# Patient Record
Sex: MALE | Race: WHITE | Employment: FULL TIME | URBAN - METROPOLITAN AREA
[De-identification: names, ages, dates, MRNs, and addresses within clinical notes are randomized per-mention and may not be internally consistent; named-entity substitution may affect disease eponyms.]

---

## 2019-01-09 ENCOUNTER — HOSPITAL ENCOUNTER (EMERGENCY)
Age: 24
Discharge: HOME | End: 2019-01-09
Payer: COMMERCIAL

## 2019-01-09 VITALS
WEIGHT: 145 LBS | TEMPERATURE: 97.9 F | SYSTOLIC BLOOD PRESSURE: 139 MMHG | DIASTOLIC BLOOD PRESSURE: 72 MMHG | RESPIRATION RATE: 16 BRPM | HEIGHT: 71 IN | HEART RATE: 97 BPM | OXYGEN SATURATION: 100 % | BODY MASS INDEX: 20.3 KG/M2

## 2019-01-09 DIAGNOSIS — M54.50 ACUTE LOW BACK PAIN WITHOUT SCIATICA, UNSPECIFIED BACK PAIN LATERALITY: ICD-10-CM

## 2019-01-09 DIAGNOSIS — R10.9 ABDOMINAL PAIN, UNSPECIFIED ABDOMINAL LOCATION: Primary | ICD-10-CM

## 2019-01-09 LAB
ALBUMIN SERPL-MCNC: 4.6 GM/DL (ref 3.4–5)
ALP BLD-CCNC: 58 IU/L (ref 40–129)
ALT SERPL-CCNC: 12 U/L (ref 10–40)
ANION GAP SERPL CALCULATED.3IONS-SCNC: 10 MMOL/L (ref 4–16)
AST SERPL-CCNC: 16 IU/L (ref 15–37)
BASOPHILS ABSOLUTE: 0 K/CU MM
BASOPHILS RELATIVE PERCENT: 0.5 % (ref 0–1)
BILIRUB SERPL-MCNC: 0.3 MG/DL (ref 0–1)
BUN BLDV-MCNC: 17 MG/DL (ref 6–23)
CALCIUM SERPL-MCNC: 9.3 MG/DL (ref 8.3–10.6)
CHLORIDE BLD-SCNC: 105 MMOL/L (ref 99–110)
CO2: 25 MMOL/L (ref 21–32)
CREAT SERPL-MCNC: 0.9 MG/DL (ref 0.9–1.3)
DIFFERENTIAL TYPE: ABNORMAL
EOSINOPHILS ABSOLUTE: 0.1 K/CU MM
EOSINOPHILS RELATIVE PERCENT: 1 % (ref 0–3)
GFR AFRICAN AMERICAN: >60 ML/MIN/1.73M2
GFR NON-AFRICAN AMERICAN: >60 ML/MIN/1.73M2
GLUCOSE BLD-MCNC: 96 MG/DL (ref 70–99)
HCT VFR BLD CALC: 47 % (ref 42–52)
HEMOGLOBIN: 14.3 GM/DL (ref 13.5–18)
IMMATURE NEUTROPHIL %: 0.3 % (ref 0–0.43)
LIPASE: 22 IU/L (ref 13–60)
LYMPHOCYTES ABSOLUTE: 2.7 K/CU MM
LYMPHOCYTES RELATIVE PERCENT: 44.3 % (ref 24–44)
MCH RBC QN AUTO: 25.4 PG (ref 27–31)
MCHC RBC AUTO-ENTMCNC: 30.4 % (ref 32–36)
MCV RBC AUTO: 83.6 FL (ref 78–100)
MONOCYTES ABSOLUTE: 0.3 K/CU MM
MONOCYTES RELATIVE PERCENT: 5.6 % (ref 0–4)
NUCLEATED RBC %: 0 %
PDW BLD-RTO: 13.4 % (ref 11.7–14.9)
PLATELET # BLD: 263 K/CU MM (ref 140–440)
PMV BLD AUTO: 9.2 FL (ref 7.5–11.1)
POTASSIUM SERPL-SCNC: 4.2 MMOL/L (ref 3.5–5.1)
RBC # BLD: 5.62 M/CU MM (ref 4.6–6.2)
SEGMENTED NEUTROPHILS ABSOLUTE COUNT: 2.9 K/CU MM
SEGMENTED NEUTROPHILS RELATIVE PERCENT: 48.3 % (ref 36–66)
SODIUM BLD-SCNC: 140 MMOL/L (ref 135–145)
TOTAL IMMATURE NEUTOROPHIL: 0.02 K/CU MM
TOTAL NUCLEATED RBC: 0 K/CU MM
TOTAL PROTEIN: 8.1 GM/DL (ref 6.4–8.2)
WBC # BLD: 6.1 K/CU MM (ref 4–10.5)

## 2019-01-09 PROCEDURE — 80053 COMPREHEN METABOLIC PANEL: CPT

## 2019-01-09 PROCEDURE — 85025 COMPLETE CBC W/AUTO DIFF WBC: CPT

## 2019-01-09 PROCEDURE — 99284 EMERGENCY DEPT VISIT MOD MDM: CPT

## 2019-01-09 PROCEDURE — 83690 ASSAY OF LIPASE: CPT

## 2019-01-09 PROCEDURE — 36415 COLL VENOUS BLD VENIPUNCTURE: CPT

## 2019-01-09 RX ORDER — NAPROXEN 500 MG/1
500 TABLET ORAL 2 TIMES DAILY PRN
Qty: 30 TABLET | Refills: 0 | Status: SHIPPED | OUTPATIENT
Start: 2019-01-09

## 2019-01-09 RX ORDER — CYCLOBENZAPRINE HCL 10 MG
10 TABLET ORAL 3 TIMES DAILY PRN
Qty: 15 TABLET | Refills: 0 | Status: SHIPPED | OUTPATIENT
Start: 2019-01-09

## 2019-01-09 ASSESSMENT — PAIN DESCRIPTION - LOCATION: LOCATION: ABDOMEN

## 2019-01-09 ASSESSMENT — PAIN SCALES - GENERAL: PAINLEVEL_OUTOF10: 6

## 2019-01-09 ASSESSMENT — PAIN DESCRIPTION - DESCRIPTORS: DESCRIPTORS: ACHING

## 2019-01-10 ENCOUNTER — APPOINTMENT (OUTPATIENT)
Dept: CT IMAGING | Age: 24
End: 2019-01-10
Payer: COMMERCIAL

## 2019-01-10 ENCOUNTER — HOSPITAL ENCOUNTER (EMERGENCY)
Age: 24
Discharge: HOME | End: 2019-01-10
Attending: EMERGENCY MEDICINE
Payer: COMMERCIAL

## 2019-01-10 ENCOUNTER — APPOINTMENT (OUTPATIENT)
Dept: ULTRASOUND IMAGING | Age: 24
End: 2019-01-10
Payer: COMMERCIAL

## 2019-01-10 VITALS
BODY MASS INDEX: 20.72 KG/M2 | RESPIRATION RATE: 16 BRPM | WEIGHT: 148 LBS | HEIGHT: 71 IN | DIASTOLIC BLOOD PRESSURE: 79 MMHG | TEMPERATURE: 97.4 F | SYSTOLIC BLOOD PRESSURE: 120 MMHG | HEART RATE: 110 BPM | OXYGEN SATURATION: 99 %

## 2019-01-10 DIAGNOSIS — R10.9 ABDOMINAL PAIN, UNSPECIFIED ABDOMINAL LOCATION: Primary | ICD-10-CM

## 2019-01-10 DIAGNOSIS — N50.819 TESTICULAR PAIN: ICD-10-CM

## 2019-01-10 LAB
BACTERIA: ABNORMAL /HPF
BILIRUBIN URINE: NEGATIVE MG/DL
BLOOD, URINE: NEGATIVE
CAST TYPE: ABNORMAL /HPF
CLARITY: CLEAR
COLOR: YELLOW
CRYSTAL TYPE: ABNORMAL /HPF
EPITHELIAL CELLS, UA: ABNORMAL /HPF
GLUCOSE, URINE: NEGATIVE MG/DL
KETONES, URINE: NEGATIVE MG/DL
LEUKOCYTE ESTERASE, URINE: NEGATIVE
MUCUS: ABNORMAL HPF
NITRITE URINE, QUANTITATIVE: NEGATIVE
PH, URINE: 6 (ref 5–8)
PROTEIN UA: NEGATIVE MG/DL
RBC URINE: ABNORMAL /HPF (ref 0–3)
SPECIFIC GRAVITY UA: 1.02 (ref 1–1.03)
UROBILINOGEN, URINE: 0.2 MG/DL (ref 0.2–1)
VOLUME, (UVOL): 12 ML (ref 10–12)
WBC UA: ABNORMAL /HPF (ref 0–2)

## 2019-01-10 PROCEDURE — 99284 EMERGENCY DEPT VISIT MOD MDM: CPT

## 2019-01-10 PROCEDURE — 81001 URINALYSIS AUTO W/SCOPE: CPT

## 2019-01-10 PROCEDURE — 76870 US EXAM SCROTUM: CPT

## 2019-01-10 PROCEDURE — 74176 CT ABD & PELVIS W/O CONTRAST: CPT

## 2019-01-10 PROCEDURE — 93975 VASCULAR STUDY: CPT

## 2019-01-10 ASSESSMENT — PAIN SCALES - GENERAL: PAINLEVEL_OUTOF10: 6

## 2019-01-10 ASSESSMENT — PAIN DESCRIPTION - LOCATION: LOCATION: ABDOMEN;GROIN

## 2019-01-10 ASSESSMENT — PAIN DESCRIPTION - ORIENTATION: ORIENTATION: RIGHT

## 2019-01-10 ASSESSMENT — PAIN DESCRIPTION - DESCRIPTORS: DESCRIPTORS: BURNING;SHARP;STABBING

## 2019-01-10 ASSESSMENT — PAIN DESCRIPTION - PAIN TYPE: TYPE: ACUTE PAIN

## 2022-10-13 ENCOUNTER — HOSPITAL ENCOUNTER (EMERGENCY)
Age: 27
Discharge: HOME | End: 2022-10-13
Attending: EMERGENCY MEDICINE

## 2022-10-13 VITALS
BODY MASS INDEX: 22.35 KG/M2 | DIASTOLIC BLOOD PRESSURE: 92 MMHG | SYSTOLIC BLOOD PRESSURE: 131 MMHG | RESPIRATION RATE: 16 BRPM | TEMPERATURE: 100.5 F | HEART RATE: 96 BPM | OXYGEN SATURATION: 98 % | HEIGHT: 72 IN | WEIGHT: 165 LBS

## 2022-10-13 DIAGNOSIS — J01.90 ACUTE SINUSITIS, RECURRENCE NOT SPECIFIED, UNSPECIFIED LOCATION: Primary | ICD-10-CM

## 2022-10-13 LAB
SARS-COV-2, NAAT: NOT DETECTED
SOURCE: NORMAL

## 2022-10-13 PROCEDURE — 96374 THER/PROPH/DIAG INJ IV PUSH: CPT

## 2022-10-13 PROCEDURE — 87081 CULTURE SCREEN ONLY: CPT

## 2022-10-13 PROCEDURE — 87635 SARS-COV-2 COVID-19 AMP PRB: CPT

## 2022-10-13 PROCEDURE — 6370000000 HC RX 637 (ALT 250 FOR IP): Performed by: EMERGENCY MEDICINE

## 2022-10-13 PROCEDURE — 87430 STREP A AG IA: CPT

## 2022-10-13 PROCEDURE — 96361 HYDRATE IV INFUSION ADD-ON: CPT

## 2022-10-13 PROCEDURE — 6360000002 HC RX W HCPCS: Performed by: EMERGENCY MEDICINE

## 2022-10-13 PROCEDURE — 2580000003 HC RX 258: Performed by: EMERGENCY MEDICINE

## 2022-10-13 PROCEDURE — 99284 EMERGENCY DEPT VISIT MOD MDM: CPT

## 2022-10-13 RX ORDER — ACETAMINOPHEN 325 MG/1
650 TABLET ORAL ONCE
Status: COMPLETED | OUTPATIENT
Start: 2022-10-13 | End: 2022-10-13

## 2022-10-13 RX ORDER — KETOROLAC TROMETHAMINE 15 MG/ML
15 INJECTION, SOLUTION INTRAMUSCULAR; INTRAVENOUS ONCE
Status: COMPLETED | OUTPATIENT
Start: 2022-10-13 | End: 2022-10-13

## 2022-10-13 RX ORDER — AZITHROMYCIN 250 MG/1
250 TABLET, FILM COATED ORAL SEE ADMIN INSTRUCTIONS
Qty: 6 TABLET | Refills: 0 | Status: SHIPPED | OUTPATIENT
Start: 2022-10-13 | End: 2022-10-18

## 2022-10-13 RX ORDER — 0.9 % SODIUM CHLORIDE 0.9 %
1000 INTRAVENOUS SOLUTION INTRAVENOUS ONCE
Status: COMPLETED | OUTPATIENT
Start: 2022-10-13 | End: 2022-10-13

## 2022-10-13 RX ADMIN — KETOROLAC TROMETHAMINE 15 MG: 15 INJECTION, SOLUTION INTRAMUSCULAR; INTRAVENOUS at 03:07

## 2022-10-13 RX ADMIN — ACETAMINOPHEN 650 MG: 325 TABLET ORAL at 03:06

## 2022-10-13 RX ADMIN — SODIUM CHLORIDE 1000 ML: 9 INJECTION, SOLUTION INTRAVENOUS at 03:05

## 2022-10-13 ASSESSMENT — ENCOUNTER SYMPTOMS
RHINORRHEA: 1
EYE DISCHARGE: 0
SHORTNESS OF BREATH: 0
ABDOMINAL PAIN: 0
VOMITING: 0
NAUSEA: 1
BACK PAIN: 0
EYE PAIN: 0
SORE THROAT: 1
COUGH: 1

## 2022-10-13 ASSESSMENT — PAIN - FUNCTIONAL ASSESSMENT: PAIN_FUNCTIONAL_ASSESSMENT: 0-10

## 2022-10-13 ASSESSMENT — PAIN SCALES - GENERAL
PAINLEVEL_OUTOF10: 8
PAINLEVEL_OUTOF10: 8

## 2022-10-13 ASSESSMENT — PAIN DESCRIPTION - LOCATION: LOCATION: GENERALIZED

## 2022-10-13 NOTE — Clinical Note
[                      ] accompanied Maricarmen Just to the emergency department on 10/13/2022. They may return to work on 10/14/2022. If you have any questions or concerns, please don't hesitate to call.       Judy Saldivar MD

## 2022-10-13 NOTE — DISCHARGE INSTRUCTIONS
Keep her self well-hydrated. Please treat your fevers with Tylenol and ibuprofen. Your symptoms do seem consistent with a viral infection. If you develop any worsening concerning symptoms, please seek immediate medical evaluation.

## 2022-10-13 NOTE — ED PROVIDER NOTES
[address] ENCOUNTER      Pt Name: Aria Booth  MRN: [id number]  [place] 1995  Date of evaluation: 10/13/2022  Provider: Latoya Lisa MD    CHIEF COMPLAINT       Chief Complaint   Patient presents with    Nasal Congestion    Headache    Generalized Body Aches         HISTORY OF PRESENT ILLNESS      Aria Booth is a 27 y.o. male who presents to the emergency department  for   Chief Complaint   Patient presents with    Nasal Congestion    Headache    Generalized Body Aches       47-year-old male presents to the emergency department complaining of several days of myalgias, sore throat and nasal congestion. Does endorse sick contacts. Some of his kids have had similar symptoms. He is not have any significant vomiting. No abdominal pain. No difficulty breathing. He did take ibuprofen earlier in the day. He presents the emergency department with no signs of respiratory distress. No active vomiting. He speaking full sentences. GCS of 15. Nursing Notes, Triage Notes & Vital Signs were reviewed. REVIEW OF SYSTEMS    (2-9 systems for level 4, 10 or more for level 5)     Review of Systems   Constitutional:  Positive for chills and fever. HENT:  Positive for congestion, rhinorrhea and sore throat. Eyes:  Negative for pain and discharge. Respiratory:  Positive for cough. Negative for shortness of breath. Cardiovascular:  Negative for chest pain and palpitations. Gastrointestinal:  Positive for nausea. Negative for abdominal pain and vomiting. Genitourinary:  Negative for dysuria and flank pain. Musculoskeletal:  Positive for myalgias. Negative for back pain. Neurological:  Negative for dizziness, facial asymmetry, light-headedness, numbness and headaches. Psychiatric/Behavioral:  Negative for confusion. Except as noted above the remainder of the review of systems was reviewed and negative.        PAST MEDICAL HISTORY     Past Medical History:   Diagnosis Date    ADHD (attention deficit hyperactivity disorder)        Prior to Admission medications    Medication Sig Start Date End Date Taking? Authorizing Provider   azithromycin (ZITHROMAX) 250 MG tablet Take 1 tablet by mouth See Admin Instructions for 5 days 500mg on day 1 followed by 250mg on days 2 - 5 10/13/22 10/18/22 Yes Lynne Rushing MD   naproxen (NAPROSYN) 500 MG tablet Take 1 tablet by mouth 2 times daily as needed for Pain 1/9/19   LORENA James   cyclobenzaprine (FLEXERIL) 10 MG tablet Take 1 tablet by mouth 3 times daily as needed for Muscle spasms 1/9/19   LORENA James   amphetamine-dextroamphetamine (ADDERALL XR) 30 MG XR capsule Take 60 mg by mouth every morning. Historical Provider, MD        There is no problem list on file for this patient. SURGICAL HISTORY     History reviewed. No pertinent surgical history. CURRENT MEDICATIONS       Previous Medications    AMPHETAMINE-DEXTROAMPHETAMINE (ADDERALL XR) 30 MG XR CAPSULE    Take 60 mg by mouth every morning. CYCLOBENZAPRINE (FLEXERIL) 10 MG TABLET    Take 1 tablet by mouth 3 times daily as needed for Muscle spasms    NAPROXEN (NAPROSYN) 500 MG TABLET    Take 1 tablet by mouth 2 times daily as needed for Pain       ALLERGIES     Zofran [ondansetron]    FAMILY HISTORY     History reviewed. No pertinent family history.        SOCIAL HISTORY       Social History     Socioeconomic History    Marital status: Single     Spouse name: None    Number of children: None    Years of education: None    Highest education level: None   Tobacco Use    Smoking status: Never    Smokeless tobacco: Never   Substance and Sexual Activity    Alcohol use: Yes     Comment: occasionally    Drug use: No    Sexual activity: Never       SCREENINGS    [place] Coma Scale  Eye Opening: Spontaneous  Best Verbal Response: Oriented  Best Motor Response: Obeys commands  [place] Coma Scale Score: 15          PHYSICAL EXAM    (up to 7 for level 4, 8 or more for level 5)     ED Triage Vitals [10/13/22 0234]   BP Temp Temp Source Heart Rate Resp SpO2 Height Weight   (!) 131/92 (!) 100.5 °F (38.1 °C) Oral (!) 116 18 100 % 6' (1.829 m) 165 lb (74.8 kg)       Physical Exam  Vitals reviewed. Constitutional:       Appearance: He is not ill-appearing or toxic-appearing. HENT:      Head: Normocephalic and atraumatic. Nose: No congestion or rhinorrhea. Mouth/Throat:      Pharynx: Oropharyngeal exudate and posterior oropharyngeal erythema present. Eyes:      Extraocular Movements: Extraocular movements intact. Pupils: Pupils are equal, round, and reactive to light. Cardiovascular:      Rate and Rhythm: Tachycardia present. Pulmonary:      Effort: Pulmonary effort is normal.      Breath sounds: No stridor. No rhonchi. Abdominal:      Palpations: Abdomen is soft. Tenderness: There is no abdominal tenderness. There is no guarding. Musculoskeletal:         General: No swelling, tenderness or deformity. Normal range of motion. Cervical back: Normal range of motion and neck supple. Skin:     General: Skin is warm. Capillary Refill: Capillary refill takes less than 2 seconds. Neurological:      General: No focal deficit present. Mental Status: He is alert. DIAGNOSTIC RESULTS     Labs Reviewed   STREP SCREEN GROUP A THROAT    Narrative:     SETUP DATE/TIME:     COVID-19, RAPID          RADIOLOGY:     Non-plain film images such as CT, Ultrasound and MRI are read by the radiologist. Plain radiographic images are visualized and preliminarily interpreted by the emergency physician.        Interpretation per the Radiologist below, if available at the time of this note:    No orders to display         ED BEDSIDE ULTRASOUND:   Performed by ED Physician Debbie Mathis MD       LABS:  Labs Reviewed   STREP SCREEN GROUP A THROAT    Narrative:     SETUP DATE/TIME: COVID-19, RAPID       All other labs were within normal range or not returned as of this dictation. EMERGENCY DEPARTMENT COURSE and DIFFERENTIAL DIAGNOSIS/MDM:   Vitals:    Vitals:    10/13/22 0234   BP: (!) 131/92   Pulse: (!) 116   Resp: 18   Temp: (!) 100.5 °F (38.1 °C)   TempSrc: Oral   SpO2: 100%   Weight: 165 lb (74.8 kg)   Height: 6' (1.829 m)           MDM  Number of Diagnoses or Management Options  Acute sinusitis, recurrence not specified, unspecified location  Diagnosis management comments: 55-year-old male presents reporting several days of some myalgias, nasal congestion and sore throat. Denies any difficulty breathing. No abdominal pain or nausea or vomiting. He does present with mild tachycardia and mild fever to 100.5. Respirations are unlabored. Saturations 100% on room air. Does state that some of his kids have been sick recently. His abdomen is soft and nonperitoneal.  No vomiting in the emergency department. He is no signs of respiratory distress. He is treated with IV Toradol, fluids as well as Tylenol. Did obtain a rapid strep test as well as a COVID test.  His strep test was negative as was the COVID test.  He does feel improved after treatment with fluids. We did discuss that symptoms are likely a viral infection. He does state that he recently had some other sinus congestion issues that was treated with a nasal spray. Did discuss that his sinusitis could be viral in origin. He will be prescribed antibiotics to use on a wait-and-see approach. I told him to monitor symptoms for a few days and if they worsen or if quality of his rhinorrhea worsens to take the antibiotics. At this time he feels improved. He will follow-up outpatient. He is given referral to primary care. He is ambulatory without difficulty. He is discharged in stable condition with return precautions. Is this patient to be included in the SEP-1 Core Measure due to severe sepsis or septic shock?    No Exclusion criteria - the patient is NOT to be included for SEP-1 Core Measure due to:  May have criteria for sepsis, but does not meet criteria for severe sepsis or septic shock         -  Patient seen and evaluated in the emergency department. -  Triage and nursing notes reviewed and incorporated. -  Old chart records reviewed and incorporated. -  Work-up included:  See above  -  Results discussed with patient. CONSULTS:  None    PROCEDURES:  None performed unless otherwise noted below     Procedures        FINAL IMPRESSION      1. Acute sinusitis, recurrence not specified, unspecified location          DISPOSITION/PLAN   DISPOSITION Decision To Discharge 10/13/2022 03:56:32 AM      PATIENT REFERRED TO:  [address] [place] [address]  [address] [place], [place] [id number]  [phone]  Schedule an appointment as soon as possible for a visit in 1 week      DISCHARGE MEDICATIONS:  New Prescriptions    AZITHROMYCIN (ZITHROMAX) 250 MG TABLET    Take 1 tablet by mouth See Admin Instructions for 5 days 500mg on day 1 followed by 250mg on days 2 - 5       ED Provider Disposition Time  DISPOSITION Decision To Discharge 10/13/2022 03:56:32 AM      Appropriate personal protective equipment was worn during the patient's evaluation. These included surgical, eye protection, surgical mask or in 95 respirator and gloves. The patient was also placed in a surgical mask for the prevention of possible spread of respiratory viral illnesses. The Patient was instructed to read the package inserts with any medication that was prescribed. Major potential reactions and medication interactions were discussed. The Patient understands that there are numerous possible adverse reactions not covered. The patient was also instructed to arrange follow-up with his or her primary care provider for review of any pending labwork or incidental findings on any radiology results that were obtained.   All efforts were made to discuss any incidental findings that require further monitoring. Controlled Substances Monitoring:     No flowsheet data found.     (Please note that portions of this note were completed with a voice recognition program.  Efforts were made to edit the dictations but occasionally words are mis-transcribed.)    Colleen Mendoza MD (electronically signed)  Attending Emergency Physician            Colleen Mendoza MD  10/13/22 2170

## 2022-10-15 LAB
CULTURE: NORMAL
Lab: NORMAL
SPECIMEN: NORMAL
STREP A DIRECT SCREEN: NEGATIVE

## 2022-10-17 NOTE — PROGRESS NOTES
Pharmacy Note  ED Culture Follow-up    Av Downs is a 27 y.o. male. Allergies: Zofran [ondansetron]     Current antimicrobials:   Reviewed patient's throat culture - culture is negative. Patient was appropriately discharged on no antimicrobial therapy for strep throat. No further action needed. Please call with any questions.  LIMA [facility], PharmD 4:34 PM 10/17/2022

## 2025-04-24 ENCOUNTER — APPOINTMENT (OUTPATIENT)
Dept: GENERAL RADIOLOGY | Age: 30
End: 2025-04-24

## 2025-04-24 ENCOUNTER — HOSPITAL ENCOUNTER (EMERGENCY)
Age: 30
Discharge: HOME | End: 2025-04-24
Attending: EMERGENCY MEDICINE

## 2025-04-24 VITALS
TEMPERATURE: 98 F | RESPIRATION RATE: 17 BRPM | HEART RATE: 82 BPM | SYSTOLIC BLOOD PRESSURE: 116 MMHG | HEIGHT: 72 IN | DIASTOLIC BLOOD PRESSURE: 87 MMHG | OXYGEN SATURATION: 98 % | WEIGHT: 165 LBS | BODY MASS INDEX: 22.35 KG/M2
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
## 2025-04-24 PROCEDURE — 6370000000 HC RX 637 (ALT 250 FOR IP): Performed by: EMERGENCY MEDICINE

## 2025-04-24 PROCEDURE — 99283 EMERGENCY DEPT VISIT LOW MDM: CPT

## 2025-04-24 PROCEDURE — 73552 X-RAY EXAM OF FEMUR 2/>: CPT

## 2025-04-24 PROCEDURE — 73090 X-RAY EXAM OF FOREARM: CPT

## 2025-04-24 RX ORDER — GINSENG 100 MG
CAPSULE ORAL ONCE
Status: COMPLETED | OUTPATIENT
Start: 2025-04-24 | End: 2025-04-24

## 2025-04-24 RX ADMIN — AMOXICILLIN AND CLAVULANATE POTASSIUM 1 TABLET: 875; 125 TABLET, FILM COATED ORAL at 04:16

## 2025-04-24 RX ADMIN — BACITRACIN 1 EACH: 500 OINTMENT TOPICAL at 04:16

## 2025-04-24 ASSESSMENT — PAIN SCALES - GENERAL
PAINLEVEL_OUTOF10: 0
PAINLEVEL_OUTOF10: 8

## 2025-04-24 ASSESSMENT — LIFESTYLE VARIABLES
HOW MANY STANDARD DRINKS CONTAINING ALCOHOL DO YOU HAVE ON A TYPICAL DAY: PATIENT DOES NOT DRINK
HOW OFTEN DO YOU HAVE A DRINK CONTAINING ALCOHOL: NEVER

## 2025-04-24 ASSESSMENT — PAIN - FUNCTIONAL ASSESSMENT: PAIN_FUNCTIONAL_ASSESSMENT: 0-10

## 2025-04-24 NOTE — ED TRIAGE NOTES
PT was  dogs that were fighting . Grabbed one dog and the other slipped out of his grasps and went to grab at the other dog and got the patient in the right knee and the left forearm.

## 2025-04-24 NOTE — ED PROVIDER NOTES
reviewed.   Constitutional:       Appearance: He is not ill-appearing or toxic-appearing.   HENT:      Head: Normocephalic and atraumatic.      Nose: Nose normal.      Mouth/Throat:      Mouth: Mucous membranes are moist.   Eyes:      Extraocular Movements: Extraocular movements intact.      Pupils: Pupils are equal, round, and reactive to light.   Cardiovascular:      Rate and Rhythm: Normal rate.      Heart sounds:      No friction rub. No gallop.   Pulmonary:      Effort: Pulmonary effort is normal.   Abdominal:      Palpations: Abdomen is soft.      Tenderness: There is no abdominal tenderness. There is no guarding.   Musculoskeletal:         General: Normal range of motion.      Cervical back: Normal range of motion.   Skin:     General: Skin is warm.      Capillary Refill: Capillary refill takes less than 2 seconds.      Comments: Multiple puncture wounds some small lacerations at right distal femur as well as left arm, bleeding controlled, 2+ radial femoral DP pulses bilaterally   Neurological:      General: No focal deficit present.      Mental Status: He is alert.         DIAGNOSTIC RESULTS     Labs Reviewed - No data to display         RADIOLOGY:     Non-plain film images such as CT, Ultrasound and MRI are read by the radiologist. Plain radiographic images are visualized and preliminarily interpreted by the emergency physician.       Interpretation per the Radiologist below, if available at the time of this note:    XR FEMUR RIGHT (MIN 2 VIEWS)   Final Result      XR RADIUS ULNA LEFT (2 VIEWS)   Final Result            ED BEDSIDE ULTRASOUND:   Performed by ED Physician Preeti Newman MD       LABS:  Labs Reviewed - No data to display    All other labs were within normal range or not returned as of this dictation.    EMERGENCY DEPARTMENT COURSE and DIFFERENTIAL DIAGNOSIS/MDM:   Vitals:    Vitals:    04/24/25 0312   BP: 116/87   Pulse: 82   Resp: 17   Temp: 98 °F (36.7 °C)   TempSrc: Oral   SpO2: 98%

## 2025-04-24 NOTE — DISCHARGE INSTRUCTIONS
You are being prescribed antibiotics to help prevent infection.  Please take them as prescribed.    Please change her dressings routinely.    Keep your wounds clean and dry.    If you develop any worsening or concerning symptoms, please seek immediate medical evaluation